# Patient Record
Sex: MALE | URBAN - METROPOLITAN AREA
[De-identification: names, ages, dates, MRNs, and addresses within clinical notes are randomized per-mention and may not be internally consistent; named-entity substitution may affect disease eponyms.]

---
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
## 2024-11-26 ENCOUNTER — HOSPITAL ENCOUNTER (OUTPATIENT)
Dept: HOSPITAL 92 - CSHSLEEP | Age: 19
Discharge: HOME | End: 2024-11-26
Attending: INTERNAL MEDICINE
Payer: COMMERCIAL

## 2024-11-26 DIAGNOSIS — R09.89: ICD-10-CM

## 2024-11-26 DIAGNOSIS — R53.83: ICD-10-CM

## 2024-11-26 DIAGNOSIS — R06.83: ICD-10-CM

## 2024-11-26 DIAGNOSIS — G47.33: Primary | ICD-10-CM

## 2024-11-26 PROCEDURE — 95800 SLP STDY UNATTENDED: CPT

## 2024-12-30 ENCOUNTER — HOSPITAL ENCOUNTER (OUTPATIENT)
Dept: HOSPITAL 92 - CSHSLEEP | Age: 19
Discharge: HOME | End: 2024-12-30
Attending: INTERNAL MEDICINE
Payer: COMMERCIAL

## 2024-12-30 DIAGNOSIS — R09.89: ICD-10-CM

## 2024-12-30 DIAGNOSIS — G47.33: Primary | ICD-10-CM

## 2024-12-30 DIAGNOSIS — R06.83: ICD-10-CM

## 2024-12-30 DIAGNOSIS — R53.83: ICD-10-CM

## 2024-12-30 PROCEDURE — 95811 POLYSOM 6/>YRS CPAP 4/> PARM: CPT
